# Patient Record
Sex: MALE | Race: WHITE | Employment: OTHER | URBAN - METROPOLITAN AREA
[De-identification: names, ages, dates, MRNs, and addresses within clinical notes are randomized per-mention and may not be internally consistent; named-entity substitution may affect disease eponyms.]

---

## 2020-01-03 PROBLEM — N40.1 BPH WITH OBSTRUCTION/LOWER URINARY TRACT SYMPTOMS: Status: ACTIVE | Noted: 2020-01-03

## 2020-01-03 PROBLEM — N13.8 BPH WITH OBSTRUCTION/LOWER URINARY TRACT SYMPTOMS: Status: ACTIVE | Noted: 2020-01-03

## 2020-01-03 PROBLEM — R39.15 URGENCY OF URINATION: Status: ACTIVE | Noted: 2020-01-03

## 2020-01-03 PROBLEM — Z12.5 PROSTATE CANCER SCREENING: Status: ACTIVE | Noted: 2020-01-03

## 2020-01-06 ENCOUNTER — HOSPITAL ENCOUNTER (OUTPATIENT)
Dept: PHYSICAL THERAPY | Facility: HOSPITAL | Age: 75
Discharge: HOME | End: 2020-01-06
Attending: ORTHOPAEDIC SURGERY
Payer: MEDICARE

## 2020-01-06 ENCOUNTER — LABORATORY ENCOUNTER (OUTPATIENT)
Dept: LAB | Facility: HOSPITAL | Age: 75
End: 2020-01-06
Attending: ORTHOPAEDIC SURGERY
Payer: MEDICARE

## 2020-01-06 DIAGNOSIS — M16.12 OSTEOARTHRITIS OF LEFT HIP: ICD-10-CM

## 2020-01-06 LAB
ANTIBODY SCREEN: NEGATIVE
RH BLOOD TYPE: POSITIVE

## 2020-01-06 PROCEDURE — 87081 CULTURE SCREEN ONLY: CPT

## 2020-01-06 PROCEDURE — 87147 CULTURE TYPE IMMUNOLOGIC: CPT

## 2020-01-06 PROCEDURE — 86850 RBC ANTIBODY SCREEN: CPT

## 2020-01-06 PROCEDURE — 86901 BLOOD TYPING SEROLOGIC RH(D): CPT

## 2020-01-06 PROCEDURE — 86900 BLOOD TYPING SEROLOGIC ABO: CPT

## 2020-01-27 ENCOUNTER — APPOINTMENT (OUTPATIENT)
Dept: GENERAL RADIOLOGY | Facility: HOSPITAL | Age: 75
DRG: 470 | End: 2020-01-27
Attending: ORTHOPAEDIC SURGERY
Payer: MEDICARE

## 2020-01-27 ENCOUNTER — ANESTHESIA EVENT (OUTPATIENT)
Dept: SURGERY | Facility: HOSPITAL | Age: 75
DRG: 470 | End: 2020-01-27
Payer: MEDICARE

## 2020-01-27 ENCOUNTER — ANESTHESIA (OUTPATIENT)
Dept: SURGERY | Facility: HOSPITAL | Age: 75
DRG: 470 | End: 2020-01-27
Payer: MEDICARE

## 2020-01-27 ENCOUNTER — HOSPITAL ENCOUNTER (INPATIENT)
Facility: HOSPITAL | Age: 75
LOS: 1 days | Discharge: HOME HEALTH | DRG: 470 | End: 2020-01-28
Attending: ORTHOPAEDIC SURGERY | Admitting: ORTHOPAEDIC SURGERY
Payer: MEDICARE

## 2020-01-27 DIAGNOSIS — M16.12 OSTEOARTHRITIS OF LEFT HIP: Primary | ICD-10-CM

## 2020-01-27 DIAGNOSIS — M16.12 PRIMARY OSTEOARTHRITIS OF LEFT HIP: ICD-10-CM

## 2020-01-27 PROCEDURE — 88304 TISSUE EXAM BY PATHOLOGIST: CPT | Performed by: ORTHOPAEDIC SURGERY

## 2020-01-27 PROCEDURE — 76000 FLUOROSCOPY <1 HR PHYS/QHP: CPT | Performed by: ORTHOPAEDIC SURGERY

## 2020-01-27 PROCEDURE — 97530 THERAPEUTIC ACTIVITIES: CPT

## 2020-01-27 PROCEDURE — 3E0T3BZ INTRODUCTION OF ANESTHETIC AGENT INTO PERIPHERAL NERVES AND PLEXI, PERCUTANEOUS APPROACH: ICD-10-PCS | Performed by: ANESTHESIOLOGY

## 2020-01-27 PROCEDURE — 97161 PT EVAL LOW COMPLEX 20 MIN: CPT

## 2020-01-27 PROCEDURE — 88311 DECALCIFY TISSUE: CPT | Performed by: ORTHOPAEDIC SURGERY

## 2020-01-27 PROCEDURE — 0SRB03Z REPLACEMENT OF LEFT HIP JOINT WITH CERAMIC SYNTHETIC SUBSTITUTE, OPEN APPROACH: ICD-10-PCS | Performed by: ORTHOPAEDIC SURGERY

## 2020-01-27 DEVICE — PINNACLE HIP SOLUTIONS ALTRX POLYETHYLENE ACETABULAR LINER NEUTRAL 36MM ID 56MM OD
Type: IMPLANTABLE DEVICE | Site: HIP | Status: FUNCTIONAL
Brand: PINNACLE ALTRX

## 2020-01-27 DEVICE — BIOLOX DELTA CERAMIC FEMORAL HEAD +5.0 36MM DIA 12/14 TAPER
Type: IMPLANTABLE DEVICE | Site: HIP | Status: FUNCTIONAL
Brand: BIOLOX DELTA

## 2020-01-27 DEVICE — PINNACLE POROCOAT ACETABULAR SHELL SECTOR II 56MM OD
Type: IMPLANTABLE DEVICE | Site: HIP | Status: FUNCTIONAL
Brand: PINNACLE POROCOAT

## 2020-01-27 RX ORDER — ACETAMINOPHEN 325 MG/1
TABLET ORAL
Status: COMPLETED
Start: 2020-01-27 | End: 2020-01-27

## 2020-01-27 RX ORDER — ATORVASTATIN CALCIUM 20 MG/1
20 TABLET, FILM COATED ORAL NIGHTLY
Status: DISCONTINUED | OUTPATIENT
Start: 2020-01-27 | End: 2020-01-28

## 2020-01-27 RX ORDER — MIDAZOLAM HYDROCHLORIDE 1 MG/ML
1 INJECTION INTRAMUSCULAR; INTRAVENOUS EVERY 5 MIN PRN
Status: DISCONTINUED | OUTPATIENT
Start: 2020-01-27 | End: 2020-01-27 | Stop reason: HOSPADM

## 2020-01-27 RX ORDER — ACETAMINOPHEN 325 MG/1
650 TABLET ORAL ONCE
Status: COMPLETED | OUTPATIENT
Start: 2020-01-27 | End: 2020-01-27

## 2020-01-27 RX ORDER — TAMSULOSIN HYDROCHLORIDE 0.4 MG/1
0.4 CAPSULE ORAL DAILY
Status: DISCONTINUED | OUTPATIENT
Start: 2020-01-27 | End: 2020-01-28

## 2020-01-27 RX ORDER — PROCHLORPERAZINE EDISYLATE 5 MG/ML
10 INJECTION INTRAMUSCULAR; INTRAVENOUS EVERY 6 HOURS PRN
Status: DISCONTINUED | OUTPATIENT
Start: 2020-01-27 | End: 2020-01-28

## 2020-01-27 RX ORDER — HYDROMORPHONE HYDROCHLORIDE 1 MG/ML
INJECTION, SOLUTION INTRAMUSCULAR; INTRAVENOUS; SUBCUTANEOUS
Status: COMPLETED
Start: 2020-01-27 | End: 2020-01-27

## 2020-01-27 RX ORDER — BUPIVACAINE HYDROCHLORIDE 7.5 MG/ML
INJECTION, SOLUTION INTRASPINAL AS NEEDED
Status: DISCONTINUED | OUTPATIENT
Start: 2020-01-27 | End: 2020-01-27 | Stop reason: SURG

## 2020-01-27 RX ORDER — BUPIVACAINE HYDROCHLORIDE AND EPINEPHRINE 2.5; 5 MG/ML; UG/ML
INJECTION, SOLUTION EPIDURAL; INFILTRATION; INTRACAUDAL; PERINEURAL AS NEEDED
Status: DISCONTINUED | OUTPATIENT
Start: 2020-01-27 | End: 2020-01-27 | Stop reason: SURG

## 2020-01-27 RX ORDER — ONDANSETRON 2 MG/ML
INJECTION INTRAMUSCULAR; INTRAVENOUS
Status: COMPLETED
Start: 2020-01-27 | End: 2020-01-27

## 2020-01-27 RX ORDER — SODIUM CHLORIDE, SODIUM LACTATE, POTASSIUM CHLORIDE, CALCIUM CHLORIDE 600; 310; 30; 20 MG/100ML; MG/100ML; MG/100ML; MG/100ML
INJECTION, SOLUTION INTRAVENOUS CONTINUOUS
Status: DISCONTINUED | OUTPATIENT
Start: 2020-01-27 | End: 2020-01-27

## 2020-01-27 RX ORDER — TIZANIDINE 2 MG/1
2 TABLET ORAL 3 TIMES DAILY PRN
Status: DISCONTINUED | OUTPATIENT
Start: 2020-01-27 | End: 2020-01-28

## 2020-01-27 RX ORDER — ACETAMINOPHEN 500 MG
500 TABLET ORAL EVERY 6 HOURS
Qty: 100 TABLET | Refills: 0 | Status: SHIPPED | OUTPATIENT
Start: 2020-01-27 | End: 2021-07-07

## 2020-01-27 RX ORDER — MEPERIDINE HYDROCHLORIDE 25 MG/ML
25 INJECTION INTRAMUSCULAR; INTRAVENOUS; SUBCUTANEOUS
Status: DISCONTINUED | OUTPATIENT
Start: 2020-01-27 | End: 2020-01-27 | Stop reason: HOSPADM

## 2020-01-27 RX ORDER — POLYETHYLENE GLYCOL 3350 17 G/17G
17 POWDER, FOR SOLUTION ORAL DAILY PRN
Status: DISCONTINUED | OUTPATIENT
Start: 2020-01-27 | End: 2020-01-28

## 2020-01-27 RX ORDER — OXYCODONE HYDROCHLORIDE 5 MG/1
5 TABLET ORAL EVERY 4 HOURS PRN
Status: DISCONTINUED | OUTPATIENT
Start: 2020-01-27 | End: 2020-01-28

## 2020-01-27 RX ORDER — TRAMADOL HYDROCHLORIDE 50 MG/1
TABLET ORAL
Qty: 30 TABLET | Refills: 0 | Status: SHIPPED | OUTPATIENT
Start: 2020-01-27 | End: 2020-03-12 | Stop reason: ALTCHOICE

## 2020-01-27 RX ORDER — OXYCODONE HYDROCHLORIDE 10 MG/1
10 TABLET ORAL EVERY 4 HOURS PRN
Status: DISCONTINUED | OUTPATIENT
Start: 2020-01-27 | End: 2020-01-28

## 2020-01-27 RX ORDER — FENOFIBRATE 145 MG/1
145 TABLET, COATED ORAL DAILY
COMMUNITY

## 2020-01-27 RX ORDER — KETOROLAC TROMETHAMINE 15 MG/ML
15 INJECTION, SOLUTION INTRAMUSCULAR; INTRAVENOUS EVERY 6 HOURS
Status: DISPENSED | OUTPATIENT
Start: 2020-01-27 | End: 2020-01-28

## 2020-01-27 RX ORDER — ONDANSETRON 2 MG/ML
4 INJECTION INTRAMUSCULAR; INTRAVENOUS AS NEEDED
Status: DISCONTINUED | OUTPATIENT
Start: 2020-01-27 | End: 2020-01-27 | Stop reason: HOSPADM

## 2020-01-27 RX ORDER — METOCLOPRAMIDE HYDROCHLORIDE 5 MG/ML
10 INJECTION INTRAMUSCULAR; INTRAVENOUS EVERY 6 HOURS PRN
Status: DISCONTINUED | OUTPATIENT
Start: 2020-01-27 | End: 2020-01-28

## 2020-01-27 RX ORDER — SENNOSIDES 8.6 MG
17.2 TABLET ORAL NIGHTLY
Status: DISCONTINUED | OUTPATIENT
Start: 2020-01-27 | End: 2020-01-28

## 2020-01-27 RX ORDER — DIPHENHYDRAMINE HYDROCHLORIDE 50 MG/ML
12.5 INJECTION INTRAMUSCULAR; INTRAVENOUS EVERY 4 HOURS PRN
Status: DISCONTINUED | OUTPATIENT
Start: 2020-01-27 | End: 2020-01-28

## 2020-01-27 RX ORDER — DOCUSATE SODIUM 100 MG/1
100 CAPSULE, LIQUID FILLED ORAL 2 TIMES DAILY
Status: DISCONTINUED | OUTPATIENT
Start: 2020-01-27 | End: 2020-01-28

## 2020-01-27 RX ORDER — ACETAMINOPHEN 325 MG/1
650 TABLET ORAL 4 TIMES DAILY
Status: DISCONTINUED | OUTPATIENT
Start: 2020-01-27 | End: 2020-01-28

## 2020-01-27 RX ORDER — DOCUSATE SODIUM 100 MG/1
100 CAPSULE, LIQUID FILLED ORAL 2 TIMES DAILY
Qty: 60 CAPSULE | Refills: 0 | Status: SHIPPED | OUTPATIENT
Start: 2020-01-27 | End: 2021-07-07

## 2020-01-27 RX ORDER — CEFAZOLIN SODIUM/WATER 2 G/20 ML
2 SYRINGE (ML) INTRAVENOUS EVERY 8 HOURS
Status: COMPLETED | OUTPATIENT
Start: 2020-01-27 | End: 2020-01-28

## 2020-01-27 RX ORDER — NALOXONE HYDROCHLORIDE 0.4 MG/ML
80 INJECTION, SOLUTION INTRAMUSCULAR; INTRAVENOUS; SUBCUTANEOUS AS NEEDED
Status: DISCONTINUED | OUTPATIENT
Start: 2020-01-27 | End: 2020-01-27 | Stop reason: HOSPADM

## 2020-01-27 RX ORDER — HYDROMORPHONE HYDROCHLORIDE 1 MG/ML
0.2 INJECTION, SOLUTION INTRAMUSCULAR; INTRAVENOUS; SUBCUTANEOUS EVERY 2 HOUR PRN
Status: DISCONTINUED | OUTPATIENT
Start: 2020-01-27 | End: 2020-01-28

## 2020-01-27 RX ORDER — DIPHENHYDRAMINE HCL 25 MG
25 CAPSULE ORAL EVERY 4 HOURS PRN
Status: DISCONTINUED | OUTPATIENT
Start: 2020-01-27 | End: 2020-01-28

## 2020-01-27 RX ORDER — ESCITALOPRAM OXALATE 10 MG/1
10 TABLET ORAL DAILY
Status: DISCONTINUED | OUTPATIENT
Start: 2020-01-28 | End: 2020-01-28

## 2020-01-27 RX ORDER — HYDROMORPHONE HYDROCHLORIDE 1 MG/ML
0.8 INJECTION, SOLUTION INTRAMUSCULAR; INTRAVENOUS; SUBCUTANEOUS EVERY 2 HOUR PRN
Status: DISCONTINUED | OUTPATIENT
Start: 2020-01-27 | End: 2020-01-28

## 2020-01-27 RX ORDER — HYDROCODONE BITARTRATE AND ACETAMINOPHEN 10; 325 MG/1; MG/1
TABLET ORAL
Qty: 30 TABLET | Refills: 0 | Status: SHIPPED | OUTPATIENT
Start: 2020-01-27 | End: 2020-03-12 | Stop reason: ALTCHOICE

## 2020-01-27 RX ORDER — ONDANSETRON 2 MG/ML
4 INJECTION INTRAMUSCULAR; INTRAVENOUS EVERY 4 HOURS PRN
Status: DISCONTINUED | OUTPATIENT
Start: 2020-01-27 | End: 2020-01-28

## 2020-01-27 RX ORDER — DIPHENHYDRAMINE HYDROCHLORIDE 50 MG/ML
25 INJECTION INTRAMUSCULAR; INTRAVENOUS ONCE AS NEEDED
Status: ACTIVE | OUTPATIENT
Start: 2020-01-27 | End: 2020-01-27

## 2020-01-27 RX ORDER — DEXAMETHASONE SODIUM PHOSPHATE 4 MG/ML
4 VIAL (ML) INJECTION AS NEEDED
Status: DISCONTINUED | OUTPATIENT
Start: 2020-01-27 | End: 2020-01-27 | Stop reason: HOSPADM

## 2020-01-27 RX ORDER — SODIUM CHLORIDE, SODIUM LACTATE, POTASSIUM CHLORIDE, CALCIUM CHLORIDE 600; 310; 30; 20 MG/100ML; MG/100ML; MG/100ML; MG/100ML
INJECTION, SOLUTION INTRAVENOUS CONTINUOUS
Status: DISCONTINUED | OUTPATIENT
Start: 2020-01-27 | End: 2020-01-27 | Stop reason: HOSPADM

## 2020-01-27 RX ORDER — METOCLOPRAMIDE HYDROCHLORIDE 5 MG/ML
10 INJECTION INTRAMUSCULAR; INTRAVENOUS AS NEEDED
Status: DISCONTINUED | OUTPATIENT
Start: 2020-01-27 | End: 2020-01-27 | Stop reason: HOSPADM

## 2020-01-27 RX ORDER — MIDAZOLAM HYDROCHLORIDE 1 MG/ML
INJECTION INTRAMUSCULAR; INTRAVENOUS AS NEEDED
Status: DISCONTINUED | OUTPATIENT
Start: 2020-01-27 | End: 2020-01-27 | Stop reason: SURG

## 2020-01-27 RX ORDER — SODIUM CHLORIDE 9 MG/ML
INJECTION, SOLUTION INTRAVENOUS CONTINUOUS
Status: DISCONTINUED | OUTPATIENT
Start: 2020-01-27 | End: 2020-01-28

## 2020-01-27 RX ORDER — CEFAZOLIN SODIUM/WATER 2 G/20 ML
2 SYRINGE (ML) INTRAVENOUS ONCE
Status: COMPLETED | OUTPATIENT
Start: 2020-01-27 | End: 2020-01-27

## 2020-01-27 RX ORDER — LISINOPRIL AND HYDROCHLOROTHIAZIDE 12.5; 1 MG/1; MG/1
1 TABLET ORAL
Status: ON HOLD | COMMUNITY
Start: 2019-12-27 | End: 2020-01-28

## 2020-01-27 RX ORDER — HYDROMORPHONE HYDROCHLORIDE 1 MG/ML
0.4 INJECTION, SOLUTION INTRAMUSCULAR; INTRAVENOUS; SUBCUTANEOUS EVERY 2 HOUR PRN
Status: DISCONTINUED | OUTPATIENT
Start: 2020-01-27 | End: 2020-01-28

## 2020-01-27 RX ORDER — FENOFIBRATE 134 MG/1
134 CAPSULE ORAL DAILY
Status: DISCONTINUED | OUTPATIENT
Start: 2020-01-27 | End: 2020-01-28

## 2020-01-27 RX ORDER — BISACODYL 10 MG
10 SUPPOSITORY, RECTAL RECTAL
Status: DISCONTINUED | OUTPATIENT
Start: 2020-01-27 | End: 2020-01-28

## 2020-01-27 RX ORDER — SODIUM PHOSPHATE, DIBASIC AND SODIUM PHOSPHATE, MONOBASIC 7; 19 G/133ML; G/133ML
1 ENEMA RECTAL ONCE AS NEEDED
Status: DISCONTINUED | OUTPATIENT
Start: 2020-01-27 | End: 2020-01-28

## 2020-01-27 RX ORDER — ACETAMINOPHEN 500 MG
1000 TABLET ORAL ONCE
Status: DISCONTINUED | OUTPATIENT
Start: 2020-01-27 | End: 2020-01-27

## 2020-01-27 RX ORDER — HYDROMORPHONE HYDROCHLORIDE 1 MG/ML
0.5 INJECTION, SOLUTION INTRAMUSCULAR; INTRAVENOUS; SUBCUTANEOUS EVERY 5 MIN PRN
Status: DISCONTINUED | OUTPATIENT
Start: 2020-01-27 | End: 2020-01-27 | Stop reason: HOSPADM

## 2020-01-27 RX ORDER — LISINOPRIL AND HYDROCHLOROTHIAZIDE 12.5; 1 MG/1; MG/1
1 TABLET ORAL
Status: DISCONTINUED | OUTPATIENT
Start: 2020-01-28 | End: 2020-01-27 | Stop reason: SDUPTHER

## 2020-01-27 RX ORDER — ASPIRIN 81 MG/1
81 TABLET ORAL 2 TIMES DAILY
Qty: 60 TABLET | Refills: 0 | Status: SHIPPED | OUTPATIENT
Start: 2020-01-27 | End: 2021-07-07

## 2020-01-27 RX ORDER — OXYCODONE HYDROCHLORIDE 15 MG/1
15 TABLET ORAL EVERY 4 HOURS PRN
Status: DISCONTINUED | OUTPATIENT
Start: 2020-01-27 | End: 2020-01-28

## 2020-01-27 RX ADMIN — CEFAZOLIN SODIUM/WATER 2 G: 2 G/20 ML SYRINGE (ML) INTRAVENOUS at 09:23:00

## 2020-01-27 RX ADMIN — MIDAZOLAM HYDROCHLORIDE 2 MG: 1 INJECTION INTRAMUSCULAR; INTRAVENOUS at 09:23:00

## 2020-01-27 RX ADMIN — SODIUM CHLORIDE, SODIUM LACTATE, POTASSIUM CHLORIDE, CALCIUM CHLORIDE: 600; 310; 30; 20 INJECTION, SOLUTION INTRAVENOUS at 10:10:00

## 2020-01-27 RX ADMIN — SODIUM CHLORIDE, SODIUM LACTATE, POTASSIUM CHLORIDE, CALCIUM CHLORIDE: 600; 310; 30; 20 INJECTION, SOLUTION INTRAVENOUS at 11:24:00

## 2020-01-27 RX ADMIN — BUPIVACAINE HYDROCHLORIDE AND EPINEPHRINE 30 ML: 2.5; 5 INJECTION, SOLUTION EPIDURAL; INFILTRATION; INTRACAUDAL; PERINEURAL at 09:34:00

## 2020-01-27 RX ADMIN — BUPIVACAINE HYDROCHLORIDE 1.2 ML: 7.5 INJECTION, SOLUTION INTRASPINAL at 09:29:00

## 2020-01-27 NOTE — ANESTHESIA PROCEDURE NOTES
[facility]  Performed by: Ian Uriarte MD  Authorized by: Ian Uriarte MD       General Information and Staff    Start Time:  1/27/2020 9:31 AM  End Time:  1/27/2020 9:35 AM  Anesthesiologist:  Wanda Dietz MD  Performed by:   Anesthesiologist

## 2020-01-27 NOTE — CM/SW NOTE
PT recommending HH/PT. [facility] care with referral.  Liaison will meet with the patient/familyto provide choice, explanation of services, and financial disclosure. Orders entered.     HOME SITUATION  Type of Home: [address] [place] [address]

## 2020-01-27 NOTE — ANESTHESIA PREPROCEDURE EVALUATION
PRE-OP EVALUATION    Patient Name: Karen Martin    Pre-op Diagnosis: Primary osteoarthritis of left hip [M16.12]    Procedure(s):  LEFT ANTERIOR TOTAL HIP REPLACEMENT    Surgeon(s) and Role:     Emperatriz Haider MD - Primary    Pre-op vitals reviewed.   Duane Keith Neuro/Psych    Negative neuro/psych ROS.                                 Past Surgical History:   Procedure Laterality Date   • CHOLECYSTECTOMY     • COLECTOMY     • COLONOSCOPY     • OTHER      bariatric lap banding      Social History    Tobacco Use

## 2020-01-27 NOTE — PLAN OF CARE
Received pt at 1330. ROSANGELA SHEEHAN with therapy. Dressing dry and intact to left hip. Pt and family verbalized understanding of POC and fall precautions. Plans home with [facility] on dc. Will continue to monitor.

## 2020-01-27 NOTE — HOME CARE LIAISON
[facility] REC'D HH ORDER FOR SERVICES ON DC. PTNT LIVES IN [place] [address]. [facility] WILL CONTACT THE INTAKE DEPARTMENT TO SEE IF WE ARE ABLE TO PROVIDE SERVICES FOR THIS PTNT. AWAITING AN ANSWER.    [address] [place] [address] Dr FOLEY      THANKS  Endy Bowers

## 2020-01-27 NOTE — PROGRESS NOTES
Iviseliu Forrestelle Shannon   12/5/2019 9:20 AM   Office Visit   MRN:  [id number]   Description: 75year old male Provider: Titi Valenzuela MD Department: [facility]   Scanning Cover Sheet     Click to print Karena Circe [address] for [address] Alcohol use: Not on file    Drug use: Not on file         REVIEW OF SYSTEMS:      GENERAL HEALTH: feels well otherwise  SKIN: denies any unusual skin lesions or rashes  RESPIRATORY: denies shortness of breath with exertion  CARDIOVASCULAR: denies chest p Dragon speech recognition software was used. If dictation is unclear, please contact me with any questions or clarifications.

## 2020-01-27 NOTE — ANESTHESIA PROCEDURE NOTES
[facility]  Performed by: Aria Uriarte MD  Authorized by: Aria Uriarte MD       General Information and Staff    Start Time:  1/27/2020 9:24 AM  End Time:  1/27/2020 9:29 AM  Anesthesiologist:  Whitney Hoffman MD  Performed by:   Anesthesiologist  P

## 2020-01-27 NOTE — CONSULTS
[facility] hospitalist initial consult note  PCP Danna Carmona at the request of Dr. Nadege Sanches  Reason for consult medical co-management  HPI 66 yo male with multiple medical problems including but not limited to  HTN, depression, HL, OA here s/p DIRECT AN Attends meetings of clubs or organizations: Not on file        Relationship status: Not on file      Intimate partner violence:        Fear of current or ex partner: Not on file        Emotionally abused: Not on file        Physically abused: Not on file

## 2020-01-27 NOTE — INTERVAL H&P NOTE
Pre-op Diagnosis: Primary osteoarthritis of left hip [M16.12]    The above referenced H&P was reviewed by Demetri Mccallum MD on 1/27/2020, the patient was examined and no significant changes have occurred in the patient's condition since the H&P was performed.

## 2020-01-27 NOTE — PHYSICAL THERAPY NOTE
PHYSICAL THERAPY HIP EVALUATION - INPATIENT     Room Number: 365/365-A  Evaluation Date: 1/27/2020  Type of Evaluation: Initial  Physician Order: PT Eval and Treat    Presenting Problem: S/p Direct Anterior Left RUT on 01/27/2020  Reason for Therapy: [facility] 8(Decreased to 6/10 after mobility)  Location: Left Hip @ surgical site & left thigh  Management Techniques: Activity promotion; Body mechanics;Breathing techniques;Relaxation;Repositioning    COGNITION  · Overall Cognitive Status:  WFL - within functional definations    Skilled Therapy Provided: Evaluation completed. Patient was instructed & educated in post surgical precautions & weight bearing status. Reviewed  handouts for precautions & reviewed multiple times during this session.  Patient was able to parti skills & safety. The patient is below baseline and would benefit from skilled inpatient PT to address the above deficits to assist patient in returning to prior to level of function.   DISCHARGE RECOMMENDATIONS  PT Discharge Recommendations: Home with home

## 2020-01-27 NOTE — ANESTHESIA POSTPROCEDURE EVALUATION
[address] [facility] Patient Status:  Surgery Admit - Inpt   Age/Gender 67year old male MRN [id number]   [facility] SURGERY Attending Titi Valenzuela MD   Hosp Day # 0 PCP [facility]       Anesthesia Post-op Note    Procedure

## 2020-01-27 NOTE — OPERATIVE REPORT
[address] REPLACEMENT OPERATIVE REPORT    DATE OF SURGERY 1/27/2020    Mark Olvera       [id number]     1/1/1945    PRE-OP DX:  LEFT HIP PRIMARY OSTEOARTHRITIS  POST-OP DX:  LEFT HIP PRIMARY OSTEOARTHRITIS  PROCEDURE:  DIRECT ANTERIO DEGENERATIVE CHANGES WERE NOTED. FEMORAL NECK OSTEOTOMY WAS MADE. FEMORAL HEAD WAS REMOVED WITH A CORK SCREW. POSTERIOR AND INFERIOR ACETABULAR RETRACTORS WERE PLACED CAREFULLY. GOOD ACETABULAR EXPOSURE WAS OBTAINED. LABRAL TISSUE WAS EXCISED.   MED STRESS. ALL THE TRIAL IMPLANTS WERE REMOVED. WOUND WAS IRRIGATED COPIOUSLY. HEMOSTASIS WAS OBTAINED. ACETABULUM WAS REEXPOSED. REAL LINER WAS IMPACTED. ITS SEATING WAS VERIFIED. PROXIMAL FEMUR WAS EXPOSED.   REAL FEMORAL STEM WAS INSERTED WITH GOOD S

## 2020-01-28 VITALS
DIASTOLIC BLOOD PRESSURE: 62 MMHG | WEIGHT: 261.13 LBS | BODY MASS INDEX: 35.37 KG/M2 | TEMPERATURE: 98 F | HEART RATE: 58 BPM | SYSTOLIC BLOOD PRESSURE: 116 MMHG | HEIGHT: 72 IN | OXYGEN SATURATION: 95 % | RESPIRATION RATE: 14 BRPM

## 2020-01-28 LAB
ANION GAP SERPL CALC-SCNC: 3 MMOL/L (ref 0–18)
BILIRUB UR QL STRIP.AUTO: NEGATIVE
BUN BLD-MCNC: 32 MG/DL (ref 7–18)
BUN/CREAT SERPL: 19.5 (ref 10–20)
CALCIUM BLD-MCNC: 8.8 MG/DL (ref 8.5–10.1)
CHLORIDE SERPL-SCNC: 110 MMOL/L (ref 98–112)
CLARITY UR REFRACT.AUTO: CLEAR
CO2 SERPL-SCNC: 27 MMOL/L (ref 21–32)
COLOR UR AUTO: YELLOW
CREAT BLD-MCNC: 1.64 MG/DL (ref 0.7–1.3)
DEPRECATED RDW RBC AUTO: 46.3 FL (ref 35.1–46.3)
ERYTHROCYTE [DISTWIDTH] IN BLOOD BY AUTOMATED COUNT: 13.7 % (ref 11–15)
GLUCOSE BLD-MCNC: 98 MG/DL (ref 70–99)
GLUCOSE UR STRIP.AUTO-MCNC: NEGATIVE MG/DL
HCT VFR BLD AUTO: 36.6 % (ref 39–53)
HGB BLD-MCNC: 11.9 G/DL (ref 13–17.5)
KETONES UR STRIP.AUTO-MCNC: NEGATIVE MG/DL
LEUKOCYTE ESTERASE UR QL STRIP.AUTO: NEGATIVE
MCH RBC QN AUTO: 29.7 PG (ref 26–34)
MCHC RBC AUTO-ENTMCNC: 32.5 G/DL (ref 31–37)
MCV RBC AUTO: 91.3 FL (ref 80–100)
NITRITE UR QL STRIP.AUTO: NEGATIVE
OSMOLALITY SERPL CALC.SUM OF ELEC: 297 MOSM/KG (ref 275–295)
PH UR STRIP.AUTO: 5 [PH] (ref 4.5–8)
PLATELET # BLD AUTO: 129 10(3)UL (ref 150–450)
POTASSIUM SERPL-SCNC: 4.3 MMOL/L (ref 3.5–5.1)
PROT UR STRIP.AUTO-MCNC: NEGATIVE MG/DL
RBC # BLD AUTO: 4.01 X10(6)UL (ref 3.8–5.8)
RBC UR QL AUTO: NEGATIVE
SODIUM SERPL-SCNC: 140 MMOL/L (ref 136–145)
SP GR UR STRIP.AUTO: 1.02 (ref 1–1.03)
UROBILINOGEN UR STRIP.AUTO-MCNC: <2 MG/DL
WBC # BLD AUTO: 5.3 X10(3) UL (ref 4–11)

## 2020-01-28 PROCEDURE — 85027 COMPLETE CBC AUTOMATED: CPT | Performed by: PHYSICIAN ASSISTANT

## 2020-01-28 PROCEDURE — 81003 URINALYSIS AUTO W/O SCOPE: CPT | Performed by: PHYSICIAN ASSISTANT

## 2020-01-28 PROCEDURE — 97165 OT EVAL LOW COMPLEX 30 MIN: CPT

## 2020-01-28 PROCEDURE — 97535 SELF CARE MNGMENT TRAINING: CPT

## 2020-01-28 PROCEDURE — 80048 BASIC METABOLIC PNL TOTAL CA: CPT | Performed by: PHYSICIAN ASSISTANT

## 2020-01-28 PROCEDURE — 97116 GAIT TRAINING THERAPY: CPT

## 2020-01-28 PROCEDURE — 97150 GROUP THERAPEUTIC PROCEDURES: CPT

## 2020-01-28 PROCEDURE — 51701 INSERT BLADDER CATHETER: CPT

## 2020-01-28 RX ORDER — HYDROCODONE BITARTRATE AND ACETAMINOPHEN 5; 325 MG/1; MG/1
1-2 TABLET ORAL EVERY 4 HOURS PRN
Status: DISCONTINUED | OUTPATIENT
Start: 2020-01-28 | End: 2020-01-28

## 2020-01-28 RX ORDER — DOCUSATE SODIUM 100 MG/1
100 CAPSULE, LIQUID FILLED ORAL 2 TIMES DAILY
Qty: 20 CAPSULE | Refills: 0 | Status: SHIPPED | OUTPATIENT
Start: 2020-01-28 | End: 2021-07-07

## 2020-01-28 NOTE — CERTIFICATION
**Certification    PHYSICIAN Certification of Need for Inpatient Hospitalization    Based on the his current state of illness, Vladimir Tejada requires inpatient hospitalization for his orthopedic surgery      This requires inpatient medical treatment [place]

## 2020-01-28 NOTE — HOME CARE LIAISON
SPOKE WITH RN AT [facility] 287 ARE ABLE TO PROVIDE SN/PT ON Brent Perez

## 2020-01-28 NOTE — PHYSICAL THERAPY NOTE
PHYSICAL THERAPY HIP TREATMENT NOTE - INPATIENT      Room Number: 365/365-A     Session: 1 and 2  Number of Visits to Meet Established Goals: 2    Presenting Problem: S/p Direct Anterior Left RUT on 01/27/2020    Problem List  Active Problems:    Comfort Crum with a railing?: A Little       AM-PAC Score:  Raw Score: 18   Approx Degree of Impairment: 46.58%   Standardized Score (AM-PAC Scale): 43.63   CMS Modifier (G-Code): CK    FUNCTIONAL ABILITY STATUS  Gait Assessment   Gait Assistance: Supervision  Distance concerns addressed    ASSESSMENT      Pt seen BID in PT group for gait training, stair training, car transfer and BLE strengthening: S/P L RUT (anterior).     Results of the AM-PAC \"6 clicks\" Inpatient Daily Mobility Short Form for the patient is 46.58% d

## 2020-01-28 NOTE — OCCUPATIONAL THERAPY NOTE
OCCUPATIONAL THERAPY QUICK EVALUATION - INPATIENT    Room Number: 365/365-A  Evaluation Date: 1/28/2020     Type of Evaluation: Quick Eval  Presenting Problem: (L RUT)    Physician Order: IP Consult to Occupational Therapy  Reason for Therapy:  ADL/IADL Dy ‘6-Clicks’ Inpatient Daily Activity Short Form   How much help from another person does the patient currently need…  -   Putting on and taking off regular lower body clothing?: A Little   -   Bathing (including washing, rinsing, drying)?: A Little  -   Jaciel hip stiffness, hip precautions, decreased balance, decreased knowledge of adaptive equipment.  These deficits impact the patient’s ability to participate in ADLs, instrumental activities of daily living, rest and sleep, work, leisure and social participatio

## 2020-01-28 NOTE — PLAN OF CARE
Pod #1, A/oX4, RA, , IS encouraged, [place] with Bilateral HA, on eliquis, LBM 01/27/20, IVF infusing as ordered, bladder scan at 0000 showed 561 cc, straight cath at 0033 of 650 cc with yellow, clear output, next due to void at 0630, up with assist, Chan

## 2020-01-28 NOTE — PROGRESS NOTES
Orthopedic surgery progress note    [place] Patient Status:  Inpatient    1945 MRN [id number]   [facility] 3SW-A Attending Valdemar Bush MD   [facility] Day # 1 PCP [facility]       Subjective:  No major hip complaints, but can't

## 2020-01-28 NOTE — CM/SW NOTE
Per [facility] with [facility], agency not able to accept pt. Re-referred to [facility] and I spoke with Marcos Dai from [employer] and they are able to accept pt. Will send AVS once completed.      Ayaka Aranda RN, [address] [place] [address]  Extension [phone]

## 2020-01-28 NOTE — CONSULTS
[facility] Urology   Consultation Note    Tra Homar Ortega Patient Status:  Inpatient    1945 MRN [id number]   [facility] 3SW-A Attending Winter Mejía MD   Hosp Day # 1 PCP [facility]     Reason for Consultation:  Urinary PRN **OR** OxyCODONE HCl IR (ROXICODONE) immediate release tab 15 mg, 15 mg, Oral, Q4H PRN  •  HYDROmorphone HCl (DILAUDID) 1 MG/ML injection 0.2 mg, 0.2 mg, Intravenous, Q2H PRN **OR** HYDROmorphone HCl (DILAUDID) 1 MG/ML injection 0.4 mg, 0.4 mg, Audra Taylor noted in HPI.     Physical Exam:  BP 90/50 (BP Location: Left arm)   Pulse 51   Temp 97.9 °F (36.6 °C) (Oral)   Resp 14   Ht 6' (1.829 m)   Wt 261 lb 2.2 oz (118.5 kg)   SpO2 91%   BMI 35.42 kg/m²   GENERAL: The patient is resting in bed in no acute distres AM

## 2020-01-28 NOTE — PLAN OF CARE
Plan of care discussed with Dr. Rony Eubanks, order to start Norco 1-2 5mg tablets PRN Q4H, consult for urology received, paged on-call urology, awaiting call back

## 2020-01-28 NOTE — PLAN OF CARE
Dr. Flynn Cooley urology on call paged back, discussed plan of care, Gallegos ordered to be put in, Urology will come by to see Pt in afternoon

## 2020-01-28 NOTE — PROGRESS NOTES
[facility] hospitalist daily note  Patient was seen/examined on 1/28/20    S; no chest pain,  No SOB, no abd pain, no nausea/emesis  Gallegos placed during admit  Pt hoping to be discharged today  Pt had low BP, IVF given with resolution.  Pt denies dizziness or ne

## 2020-01-29 NOTE — PLAN OF CARE
Pt cleared for discharge home today with solis catheter and home health care. Pt and wife attended discharge education class this afternoon. Solis catheter placed this morning, solis and leg bag teaching completed with pt and wife.  Demonstration of emptyin

## 2020-01-29 NOTE — CM/SW NOTE
01/28/20 1300   Discharge disposition   Expected discharge disposition Home-Health   Name of [address] services after discharge Skilled home care   Discharge transportation Private car   DC 1/28/2020, AVS sent to Ad

## 2020-02-17 PROBLEM — M25.552 PAIN OF LEFT HIP JOINT: Status: ACTIVE | Noted: 2020-02-17

## 2020-02-17 PROBLEM — Z96.642 STATUS POST TOTAL REPLACEMENT OF LEFT HIP: Status: ACTIVE | Noted: 2020-02-17

## 2020-09-08 ENCOUNTER — APPOINTMENT (OUTPATIENT)
Dept: OTOLARYNGOLOGY | Age: 75
End: 2020-09-08

## 2020-10-07 ENCOUNTER — APPOINTMENT (OUTPATIENT)
Dept: CARDIOLOGY | Age: 75
End: 2020-10-07

## 2021-07-07 ENCOUNTER — OFFICE VISIT (OUTPATIENT)
Dept: INTERNAL MEDICINE CLINIC | Facility: CLINIC | Age: 76
End: 2021-07-07
Payer: MEDICARE

## 2021-07-07 VITALS
WEIGHT: 251.81 LBS | BODY MASS INDEX: 34.11 KG/M2 | OXYGEN SATURATION: 96 % | HEART RATE: 62 BPM | SYSTOLIC BLOOD PRESSURE: 126 MMHG | DIASTOLIC BLOOD PRESSURE: 64 MMHG | TEMPERATURE: 99 F | HEIGHT: 72 IN

## 2021-07-07 DIAGNOSIS — F32.A DEPRESSION, UNSPECIFIED DEPRESSION TYPE: ICD-10-CM

## 2021-07-07 DIAGNOSIS — Z13.1 SCREENING FOR DIABETES MELLITUS: ICD-10-CM

## 2021-07-07 DIAGNOSIS — Z13.0 SCREENING FOR DEFICIENCY ANEMIA: ICD-10-CM

## 2021-07-07 DIAGNOSIS — E78.5 HYPERLIPIDEMIA, UNSPECIFIED HYPERLIPIDEMIA TYPE: Primary | ICD-10-CM

## 2021-07-07 DIAGNOSIS — Z85.038 HISTORY OF COLON CANCER: ICD-10-CM

## 2021-07-07 DIAGNOSIS — Z86.69 HISTORY OF MIGRAINE: ICD-10-CM

## 2021-07-07 DIAGNOSIS — Z90.49 S/P RIGHT COLECTOMY: ICD-10-CM

## 2021-07-07 PROBLEM — R39.15 URGENCY OF URINATION: Status: RESOLVED | Noted: 2020-01-03 | Resolved: 2021-07-07

## 2021-07-07 PROBLEM — Z12.5 PROSTATE CANCER SCREENING: Status: RESOLVED | Noted: 2020-01-03 | Resolved: 2021-07-07

## 2021-07-07 PROCEDURE — 3074F SYST BP LT 130 MM HG: CPT | Performed by: FAMILY MEDICINE

## 2021-07-07 PROCEDURE — 3008F BODY MASS INDEX DOCD: CPT | Performed by: FAMILY MEDICINE

## 2021-07-07 PROCEDURE — 90471 IMMUNIZATION ADMIN: CPT | Performed by: FAMILY MEDICINE

## 2021-07-07 PROCEDURE — 99204 OFFICE O/P NEW MOD 45 MIN: CPT | Performed by: FAMILY MEDICINE

## 2021-07-07 PROCEDURE — 3078F DIAST BP <80 MM HG: CPT | Performed by: FAMILY MEDICINE

## 2021-07-07 RX ORDER — RIZATRIPTAN BENZOATE 10 MG/1
10 TABLET ORAL 2 TIMES DAILY PRN
COMMUNITY
Start: 2021-05-24

## 2021-07-07 RX ORDER — ATORVASTATIN CALCIUM 20 MG/1
20 TABLET, FILM COATED ORAL DAILY
Qty: 90 TABLET | Refills: 3 | Status: SHIPPED | OUTPATIENT
Start: 2021-07-07

## 2021-07-07 NOTE — PATIENT INSTRUCTIONS
Hold Fenofibrate for the next three months and we will plan to repeat your cholesterol again then. Continue the Atorvastatin.   Schedule appointment with the Gastroenterologist.

## 2021-07-08 NOTE — PROGRESS NOTES
HPI/Subjective:   Patient ID: Tegan Souza is a 67year old male. HPI Here to establish care. 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 Has been taking statin and fenofibrate for years for cholesterol. Is due for check. Running Out of Food in the Last Year:       Ran Out of Food in the Last Year:   Transportation Needs:       Lack of Transportation (Medical):       Lack of Transportation (Non-Medical):   Physical Activity:       Days of Exercise per Week:       Minutes of Cardiovascular:      Rate and Rhythm: Normal rate and regular rhythm. Heart sounds: Normal heart sounds. Pulmonary:      Effort: Pulmonary effort is normal.      Breath sounds: Normal breath sounds. Neurological:      Mental Status: He is alert.

## 2022-03-30 ENCOUNTER — TELEPHONE (OUTPATIENT)
Dept: INTERNAL MEDICINE CLINIC | Facility: CLINIC | Age: 77
End: 2022-03-30

## 2022-11-24 ENCOUNTER — HOSPITAL ENCOUNTER (OUTPATIENT)
Age: 77
Discharge: HOME | End: 2022-11-24
Payer: MEDICARE

## 2022-11-24 VITALS
TEMPERATURE: 98 F | OXYGEN SATURATION: 98 % | HEART RATE: 62 BPM | DIASTOLIC BLOOD PRESSURE: 68 MMHG | RESPIRATION RATE: 16 BRPM | SYSTOLIC BLOOD PRESSURE: 98 MMHG

## 2022-11-24 DIAGNOSIS — J06.9 VIRAL UPPER RESPIRATORY ILLNESS: Primary | ICD-10-CM

## 2022-11-24 PROCEDURE — 99203 OFFICE O/P NEW LOW 30 MIN: CPT | Performed by: PHYSICIAN ASSISTANT

## 2022-11-24 RX ORDER — METHYLPREDNISOLONE 4 MG/1
TABLET ORAL
Qty: 1 EACH | Refills: 0 | Status: SHIPPED | OUTPATIENT
Start: 2022-11-24

## 2022-11-24 RX ORDER — BENZONATATE 100 MG/1
100 CAPSULE ORAL 3 TIMES DAILY PRN
Qty: 30 CAPSULE | Refills: 0 | Status: SHIPPED | OUTPATIENT
Start: 2022-11-24 | End: 2022-12-24

## 2022-11-24 NOTE — DISCHARGE INSTRUCTIONS
Monitor closely for fever development, shortness of breath, night sweats or increased production. Primary care follow-up. Push clear fluids.

## 2023-03-09 ENCOUNTER — HOSPITAL ENCOUNTER (OUTPATIENT)
Age: 78
Discharge: HOME | End: 2023-03-09
Payer: MEDICARE

## 2023-03-09 VITALS
BODY MASS INDEX: 29.26 KG/M2 | HEART RATE: 65 BPM | WEIGHT: 216 LBS | SYSTOLIC BLOOD PRESSURE: 125 MMHG | OXYGEN SATURATION: 98 % | RESPIRATION RATE: 18 BRPM | DIASTOLIC BLOOD PRESSURE: 84 MMHG | TEMPERATURE: 97 F | HEIGHT: 72 IN

## 2023-03-09 DIAGNOSIS — Z20.822 LAB TEST NEGATIVE FOR COVID-19 VIRUS: ICD-10-CM

## 2023-03-09 DIAGNOSIS — Z20.822 CLOSE EXPOSURE TO COVID-19 VIRUS: Primary | ICD-10-CM

## 2023-03-09 LAB — SARS-COV-2 RNA RESP QL NAA+PROBE: NOT DETECTED

## 2023-03-09 PROCEDURE — U0002 COVID-19 LAB TEST NON-CDC: HCPCS | Performed by: NURSE PRACTITIONER

## 2023-03-09 PROCEDURE — 99202 OFFICE O/P NEW SF 15 MIN: CPT | Performed by: NURSE PRACTITIONER

## 2023-03-09 RX ORDER — VALSARTAN 40 MG/1
40 TABLET ORAL DAILY
COMMUNITY

## 2023-03-11 ENCOUNTER — HOSPITAL ENCOUNTER (OUTPATIENT)
Age: 78
Discharge: HOME | End: 2023-03-11
Payer: MEDICARE

## 2023-03-11 VITALS
TEMPERATURE: 98 F | OXYGEN SATURATION: 98 % | RESPIRATION RATE: 18 BRPM | DIASTOLIC BLOOD PRESSURE: 66 MMHG | SYSTOLIC BLOOD PRESSURE: 93 MMHG | HEART RATE: 68 BPM

## 2023-03-11 DIAGNOSIS — U07.1 COVID-19: ICD-10-CM

## 2023-03-11 DIAGNOSIS — R05.9 COUGH: Primary | ICD-10-CM

## 2023-03-11 LAB
S PYO AG THROAT QL: NEGATIVE
SARS-COV-2 RNA RESP QL NAA+PROBE: DETECTED

## 2023-03-11 RX ORDER — NIRMATRELVIR AND RITONAVIR 300-100 MG
KIT ORAL
Qty: 30 TABLET | Refills: 0 | Status: SHIPPED | OUTPATIENT
Start: 2023-03-11 | End: 2023-03-16

## 2023-03-11 NOTE — DISCHARGE INSTRUCTIONS
Per the CDC recommendations persons with COVID-19 who have symptoms and were directed to the care for themselves at home may discontinue isolation of the following conditions. Stay home for 5 days. If you have no symptoms or your symptoms are resolving after 5 days you can leave your house. Continue to wear a mask around others for 5 additional days. If you have a fever continue stay at home until your fever resolves. If you are diagnosed with Covid, refrain from exercise until approved by your primary care physician. Please call your primary care provider within 2 to 3 days of your discharge to arrange a telehealth follow-up. CDC does not recommend repeat testing after positive test.  Take Tylenol and ibuprofen as needed for fever, body aches and chills.   Over-the-counter multivitamins, take melatonin at night    Do not take your cholesterol medicine while on the Paxlovid

## 2023-12-30 ENCOUNTER — HOSPITAL ENCOUNTER (OUTPATIENT)
Age: 78
Discharge: HOME | End: 2023-12-30
Payer: MEDICARE

## 2023-12-30 VITALS
WEIGHT: 220 LBS | HEART RATE: 67 BPM | RESPIRATION RATE: 18 BRPM | BODY MASS INDEX: 29.8 KG/M2 | HEIGHT: 72 IN | TEMPERATURE: 99 F | OXYGEN SATURATION: 98 % | DIASTOLIC BLOOD PRESSURE: 89 MMHG | SYSTOLIC BLOOD PRESSURE: 162 MMHG

## 2023-12-30 DIAGNOSIS — H93.8X3 EAR FULLNESS, BILATERAL: ICD-10-CM

## 2023-12-30 DIAGNOSIS — U07.1 LAB TEST POSITIVE FOR DETECTION OF COVID-19 VIRUS: Primary | ICD-10-CM

## 2023-12-30 LAB
POCT INFLUENZA A: NEGATIVE
POCT INFLUENZA B: NEGATIVE
SARS-COV-2 RNA RESP QL NAA+PROBE: DETECTED

## 2023-12-30 RX ORDER — DEXAMETHASONE 4 MG/1
4 TABLET ORAL ONCE
Status: COMPLETED | OUTPATIENT
Start: 2023-12-30 | End: 2023-12-30

## 2023-12-30 NOTE — DISCHARGE INSTRUCTIONS
Please return to the ER/clinic if symptoms worsen. Follow-up with your PCP in 24-48 hours as needed. The decadron will work in your system the next several days. Recommend taking an over the counter antihistamine daily: IE zyrtec/claritin. Sleep more upright. Use chloraseptic spray to help stop the cough trigger reflex. Push fluids and gargle with warm saline rinses. If symptoms persist or worsen i.e. increasing fevers or shortness of breath go directly to the emergency room. Otherwise CDC recommends 5 days quarantine from symptoms in 5 days having masking.

## 2023-12-30 NOTE — ED INITIAL ASSESSMENT (HPI)
Pt with c/o runny nose, cough and bilateral ear pain x 2 days.      Has ill wife at home he is caring for and would like viral testing

## 2024-07-24 ENCOUNTER — HOSPITAL ENCOUNTER (OUTPATIENT)
Age: 79
Discharge: HOME | End: 2024-07-24
Payer: MEDICARE

## 2024-07-24 VITALS
TEMPERATURE: 98 F | OXYGEN SATURATION: 98 % | HEART RATE: 72 BPM | DIASTOLIC BLOOD PRESSURE: 84 MMHG | RESPIRATION RATE: 18 BRPM | SYSTOLIC BLOOD PRESSURE: 111 MMHG

## 2024-07-24 DIAGNOSIS — S61.451A DOG BITE OF RIGHT HAND, INITIAL ENCOUNTER: Primary | ICD-10-CM
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
## 2024-07-24 PROCEDURE — 99213 OFFICE O/P EST LOW 20 MIN: CPT | Performed by: NURSE PRACTITIONER

## 2024-07-24 PROCEDURE — 90715 TDAP VACCINE 7 YRS/> IM: CPT | Performed by: NURSE PRACTITIONER

## 2024-07-24 PROCEDURE — 90471 IMMUNIZATION ADMIN: CPT | Performed by: NURSE PRACTITIONER

## 2024-07-24 RX ORDER — AMOXICILLIN AND CLAVULANATE POTASSIUM 875; 125 MG/1; MG/1
1 TABLET, FILM COATED ORAL 2 TIMES DAILY
Qty: 10 TABLET | Refills: 0 | Status: SHIPPED | OUTPATIENT
Start: 2024-07-24 | End: 2024-07-29

## 2024-07-24 NOTE — ED INITIAL ASSESSMENT (HPI)
Pt here w/ two skin tears to r hand from daughter's dog. Dog UTD on immunizations. Pt not UTD on tetanus.

## 2024-07-24 NOTE — ED PROVIDER NOTES
Patient Seen in: [facility]      History     Chief Complaint   Patient presents with    Laceration/Abrasion     Stated Complaint: Dog Bite    Subjective:   79-year-old male presents today with history of dog bite to the right hand 2 hours prior to arrival.  States was his daughter's dog who is up-to-date on all of its vaccinations.  Bleeding is controlled.  Has skin tears and puncture wounds to the dorsal aspect of the hand.  Denies any other injuries or concerns.  Full range of motion of hand.  Last Tdap states was about 20 years ago  The patient's medication list, past medical history and social history elements as listed in today's nurse's notes were reviewed and agreed (except as otherwise stated in the HPI).  The patient's family history reviewed and determined to be noncontributory to the presenting problem            Objective:   Past Medical History:    Cancer (HCC)    Colon Cancer - no chemo or radiation on colon resection    Hearing impairment    [place] - has bilateral hearing aids    High cholesterol              Past Surgical History:   Procedure Laterality Date    Cholecystectomy      Colectomy      Colonoscopy      Hip replacement surgery Left 01/27/2020    Other      bariatric lap banding                 Social History     Socioeconomic History    Marital status:    Tobacco Use    Smoking status: Never    Smokeless tobacco: Never   Vaping Use    Vaping status: Never Used   Substance and Sexual Activity    Alcohol use: Yes    Drug use: Yes     Types: Cannabis     Social Determinants of Health      Received from [facility], [facility]    Social Connections    Received from [facility], [facility]    Housing Stability              Review of Systems    Positive for stated Chief Complaint: Laceration/Abrasion    Other systems are as noted in HPI.  Constitutional and vital signs reviewed.      All other systems  reviewed and negative except as noted above.    Physical Exam     ED Triage Vitals [07/24/24 1639]   /84   Pulse 72   Resp 18   Temp 98.3 °F (36.8 °C)   Temp src Temporal   SpO2 98 %   O2 Device None (Room air)       Current Vitals:   Vital Signs  BP: 111/84  Pulse: 72  Resp: 18  Temp: 98.3 °F (36.8 °C)  Temp src: Temporal    Oxygen Therapy  SpO2: 98 %  O2 Device: None (Room air)            Physical Exam  Vitals and nursing note reviewed.   Constitutional:       Appearance: Normal appearance.   HENT:      Head: Normocephalic.      Mouth/Throat:      Mouth: Mucous membranes are moist.   Cardiovascular:      Rate and Rhythm: Normal rate.   Pulmonary:      Effort: Pulmonary effort is normal.   Musculoskeletal:      Comments: Multiple superficial injuries with puncture wound as well to the dorsal aspect of the right hand.  Bleeding is controlled.  Normal flexion extension of the hand/fingers.  CMS intact.   Skin:     General: Skin is warm and dry.   Neurological:      Mental Status: He is alert and oriented to person, place, and time.               ED Course   Labs Reviewed - No data to display                   MDM     Please note that this report has been produced using speech recognition software and may contain errors related to that system including, but not limited to, errors in grammar, punctuation, and spelling, as well as words and phrases that possibly may have been recognized inappropriately.  If there are any questions or concerns, contact the dictating provider for clarification.                                         Medical Decision Making  Differential diagnosis includes but is not limited to: Dog bite, laceration, puncture wound, tendon rupture      Presents today with history of dog bite to the right hand.  Was family dog up-to-date with vaccinations.  Unknown patient's last Tdap thinks maybe 20 years ago.  Was updated here in the office today.  Wound care was completed.  No need for suturing  wounds are superficial, bleeding is controlled.  Will start patient on Augmentin as prophylactic treatment.  Wound care instructions given.  To follow with primary care physician as needed for any signs of infection or may return here to the immediate care.  Patient verbalized understanding and agreed with plan of care.    Risk  OTC drugs.  Prescription drug management.        Disposition and Plan     Clinical Impression:  1. Dog bite of right hand, initial encounter         Disposition:  Discharge  7/24/2024  4:59 pm    Follow-up:  Amira Morgan  [address] [facility] [address] [phone]      As needed          Medications Prescribed:  Current Discharge Medication List        START taking these medications    Details   amoxicillin clavulanate 875-125 MG Oral Tab Take 1 tablet by mouth 2 (two) times daily for 5 days.  Qty: 10 tablet, Refills: 0

## 2024-11-25 ENCOUNTER — OFFICE VISIT (OUTPATIENT)
Dept: FAMILY MEDICINE CLINIC | Facility: CLINIC | Age: 79
End: 2024-11-25
Payer: MEDICARE

## 2024-11-25 VITALS
SYSTOLIC BLOOD PRESSURE: 120 MMHG | DIASTOLIC BLOOD PRESSURE: 76 MMHG | WEIGHT: 230 LBS | BODY MASS INDEX: 31.15 KG/M2 | TEMPERATURE: 98 F | OXYGEN SATURATION: 97 % | HEART RATE: 58 BPM | HEIGHT: 72 IN | RESPIRATION RATE: 18 BRPM

## 2024-11-25 DIAGNOSIS — J01.00 ACUTE NON-RECURRENT MAXILLARY SINUSITIS: Primary | ICD-10-CM

## 2024-11-25 PROCEDURE — 99213 OFFICE O/P EST LOW 20 MIN: CPT | Performed by: NURSE PRACTITIONER

## 2024-11-25 NOTE — PROGRESS NOTES
CHIEF COMPLAINT:     Chief Complaint   Patient presents with    Nasal Congestion     Started couple weeks ago          HPI:   Rikki Ortega is a 79 year old male who presents for upper respiratory symptoms for  2 weeks. Patient reports congestion, sinus pain, post nasal drip, cough from post nasal . Symptoms have been unchanged since onset.  Treating symptoms with otc medication.      Current Outpatient Medications   Medication Sig Dispense Refill    amoxicillin clavulanate 875-125 MG Oral Tab Take 1 tablet by mouth 2 (two) times daily for 7 days. 14 tablet 0    valsartan 40 MG Oral Tab Take 1 tablet (40 mg total) by mouth daily.      methylPREDNISolone (MEDROL) 4 MG Oral Tablet Therapy Pack Dosepack: take as directed (Patient not taking: Reported on 3/11/2023) 1 each 0    Rizatriptan Benzoate 10 MG Oral Tab Take 1 tablet (10 mg total) by mouth 2 (two) times daily as needed. (Patient not taking: Reported on 12/30/2023)      atorvastatin 20 MG Oral Tab Take 1 tablet (20 mg total) by mouth daily. 90 tablet 3    Fenofibrate 145 MG Oral Tab Take 1 tablet (145 mg total) by mouth daily. (Patient not taking: Reported on 12/30/2023)      Citalopram Hydrobromide 20 MG Oral Tab Take 1 tablet (20 mg total) by mouth daily.        Past Medical History:    Cancer (HCC)    Colon Cancer - no chemo or radiation on colon resection    Hearing impairment    [place] - has bilateral hearing aids    High cholesterol      Past Surgical History:   Procedure Laterality Date    Cholecystectomy      Colectomy      Colonoscopy      Hip replacement surgery Left 01/27/2020    Other      bariatric lap banding          Social History     Socioeconomic History    Marital status:    Tobacco Use    Smoking status: Never    Smokeless tobacco: Never   Vaping Use    Vaping status: Never Used   Substance and Sexual Activity    Alcohol use: Yes    Drug use: Yes     Types: Cannabis     Social Drivers of Health      Received from [facility]  [facility], [facility]    Social Connections    Received from [facility], [facility]    Housing Stability         REVIEW OF SYSTEMS:   GENERAL: normal appetite  SKIN: no rashes or abnormal skin lesions  HEENT: See HPI  LUNGS: See HPI  CARDIOVASCULAR: denies chest pain or palpitations   GI: denies N/V/C or abdominal pain      EXAM:   /76   Pulse 58   Temp 97.6 °F (36.4 °C)   Resp 18   Ht 6' (1.829 m)   Wt 230 lb (104.3 kg)   SpO2 97%   BMI 31.19 kg/m²   GENERAL: well developed, well nourished,in no apparent distress  SKIN: no rashes,no suspicious lesions  HEAD: atraumatic, normocephalic.  pos tenderness on palpation of maxillary sinuses  EYES: conjunctiva clear, EOM intact  EARS: TM's pearly, no bulging, no retraction,no fluid, bony landmarks visible  NOSE: Nostrils patent, clear nasal discharge, nasal mucosa red and inflamed   THROAT: Oral mucosa pink, moist. Posterior pharynx is not erythematous. no exudates. Tonsils 1/4.    NECK: Supple, non-tender  LUNGS: clear to auscultation bilaterally, no wheezes or rhonchi. Breathing is non labored.  CARDIO: RRR without murmur  EXTREMITIES: no cyanosis, clubbing or edema  LYMPH:  no lymphadenopathy.        ASSESSMENT AND PLAN:   Rikki Ortega is a 79 year old male who presents with upper respiratory symptoms that are consistent with    ASSESSMENT:   Encounter Diagnosis   Name Primary?    Acute non-recurrent maxillary sinusitis Yes       PLAN: Meds as below.  Comfort care as described in Patient Instructions  Educated on medication  Educated on supportive measures: ibuprofen/tylenol, hydration, claritin  Educated on s/s of worsening sx and when to seek higher level of care  Follow up with pcp if not improving      Meds & Refills for this Visit:  Requested Prescriptions     Signed Prescriptions Disp Refills    amoxicillin clavulanate 875-125 MG Oral Tab 14 tablet 0     Sig: Take 1 tablet by mouth 2  (two) times daily for 7 days.     Risks, benefits, and side effects of medication explained and discussed.    The patient indicates understanding of these issues and agrees to the plan.  The patient is asked to f/u with PCP if sx's persist or worsen.  There are no Patient Instructions on file for this visit.

## (undated) DEVICE — ANTERIOR HIP: Brand: MEDLINE INDUSTRIES, INC.

## (undated) DEVICE — BLADE ELECTRODE: Brand: EDGE

## (undated) DEVICE — Device: Brand: PLUMEPEN

## (undated) DEVICE — PADDING CAST COTTON  4

## (undated) DEVICE — DRESSING AQUACEL AG 3.5 X 10

## (undated) DEVICE — SUTURE ETHIBOND 5 CCS

## (undated) DEVICE — STERILE POLYISOPRENE POWDER-FREE SURGICAL GLOVES: Brand: PROTEXIS

## (undated) DEVICE — SPECIMEN CONTAINER,POSITIVE SEAL INDICATOR, OR PACKAGED: Brand: PRECISION

## (undated) DEVICE — Device: Brand: STABLECUT®

## (undated) DEVICE — GOWN SURG AERO CHROME XXL

## (undated) DEVICE — Device

## (undated) DEVICE — DRAPE C-ARM UNIVERSAL

## (undated) DEVICE — SUTURE VICRYL 1 CPX

## (undated) DEVICE — SUTURE VICRYL 2-0 CP-1

## (undated) DEVICE — 3M™ STERI-STRIP™ REINFORCED ADHESIVE SKIN CLOSURES, R1547, 1/2 IN X 4 IN (12 MM X 100 MM), 6 STRIPS/ENVELOPE: Brand: 3M™ STERI-STRIP™

## (undated) DEVICE — HOOD, PEEL-AWAY: Brand: FLYTE

## (undated) DEVICE — SUTURE MONOCRYL 3-0 PS-2

## (undated) DEVICE — WRAP COOLING HIP W/ICE PILLOW

## (undated) DEVICE — PILLOW ABDUCTION HIP SM

## (undated) DEVICE — DECANTER BAG 9": Brand: MEDLINE INDUSTRIES, INC.

## (undated) DEVICE — KENDALL SCD EXPRESS SLEEVES, KNEE LENGTH, MEDIUM: Brand: KENDALL SCD

## (undated) DEVICE — CODMAN® INTEGRATED BIPOLAR CORD AND TUBING SET FLYING LEADS, ROTARY PUMP: Brand: CODMAN®

## (undated) NOTE — IP AVS SNAPSHOT
[address]            (For Outpatient Use Only) Initial Admit Date: 1/27/2020   Inpt/Obs Admit Date: Inpt: 1/27/20 / Obs: N/A   Discharge Date:    Milana Later:  [de-identified]   MRN: [de-identified]   CSN: [id number]   CEID: [id number] Subscriber ID:  Pt Rel to Subscriber:    Hospital Account Financial Class: Medicare    January 28, 2020

## (undated) NOTE — IP AVS SNAPSHOT
Patient Demographics     Address  [address] [place] [id number] Phone  [phone] (Home)  [phone] (Mobile) *Preferred* E-mail Address  [email or web address]      Emergency Contact(s)     Name Relation Home Work [address] [place] [address] Take aspirin 81 mg twice daily for 30 days. Tylenol may be taken round the clock to ease your mild to moderate pain. Extra Strength Tylenol 1 tab (500mg) 4 times daily. (maximum 2000mg daily)  Tramadol is a pain medication for moderate pain.   Use as nee should wash your drainage bag 1 x/week. ? After disconnecting the tubing from the catheter, pour a solution of 1 part bleach to 10 parts water through the tubing and the bag.  Rinse the inside and the outside of the drainage bag with water to remove all th o AQUACEL dressing is waterproof and does not require being covered before showering.   o Pat dressing and surrounding skin dry after shower                      AQUACEL    o MEDIPORE/COVERLET dressing is NOT waterproof and REQUIRES being covered with a isadora ? Watch for increased redness, warmth, any odor, increased drainage or opening of the incision. A little clear yellow or blood tinged drainage is normal up to 2 weeks after surgery but it should be less every day until it stops.   ? Call physician if you no ? Apply ice  or cold therapy to surgical site for 20 minutes at least four times a day, especially after therapy. Be sure there is a thin cloth barrier between skin and ice or cold therapy. ?  Change position at least every 45 minutes while awake to avoid ? Schedule outpatient physical therapy per your surgeon’s orders. ? Schedule one week follow up after surgery WITH PRIMARY CARE PHYSICIAN; review your medications over last 6 months.   Your body gets stressed by surgery and that stress can affect all your know that you had your hip replaced, as you will most likely set off the metal detector. The doctors no longer provide an identification card for this as they are easily copied.  ALSO request a wheelchair the first year to board and get off a plane…this aid directly on top of the first creating the double layer. ? Makes sure it is NOT bunched up anywhere. ? IF the tubigrip feels TOO tight, then cut it in half and only use one layer on the calf.                Please seek medical attention if your symptoms Why:  urology follow-up in 2-3 days for removal of solis catheter. Can see either nurse or physician assistant  Contact information:  [address] [place] [address]  Schedule an appointment as soon as HYDROcodone-acetaminophen  MG Tabs  Commonly known as:  Norco  Next dose due:  Last dose received today at 10 am  Notes to patient:  Do not exceed 3,000 mg of acetaminophen in 24 hours.        Take 1 tablets by mouth every 6 (six) hours as needed for 01/28/20 0546 Given      [id number] PEG 3350 (MIRALAX) powder packet 17 g 01/28/20 0635 Given      [id number] Senna (SENOKOT) tab 17.2 mg 01/27/20 2134 Given      [id number] acetaminophen (TYLENOL) tab 650 mg 01/27/20 2134 Given      [id number] [facility] Clarity Urine Clear Clear — [facility]   Spec Gravity 1.021 1.001 - 1.030 — [facility]   Glucose Urine Negative Negative mg/dl — [facility]   Bilirubin Urine Negative Negative — [facility]   Ketones Urine Negative Negative mg/dL — [facility]   Blood Urine RBC 4.01 3.80 - 5.80 x10(6)uL — [facility]   HGB 11.9 13.0 - 17.5 g/dL  [facility]   HCT 36.6 39.0 - 53.0 %  [facility]   .0 150.0 - 450.0 10(3)uL  [facility]   MCV 91.3 80.0 - 100.0 fL — [facility]   MCH 29.7 26.0 - 34.0 pg — [facility]   MCHC 32. 5 • OTHER      bariatric lap banding[ID.2]      Fam Hx family from [facility][ID.3]    Social History    Socioeconomic History      Marital status:       Spouse name: Not on file      Number of children: Not on file      Years of education: Not on file atorvastatin 20 MG Oral Tab, Take 20 mg by mouth daily. , Disp: , Rfl:   Citalopram Hydrobromide 20 MG Oral Tab, Take 20 mg by mouth daily.   , Disp: , Rfl:       ROS 10 systems reviewed and negative except as in HPI  PE[ID.2]    01/27/20  1410   BP: 101/6 PHYSICAL THERAPY HIP TREATMENT NOTE - INPATIENT      Room Number: 365/365-A     Session: 1 and 2[AR.1]  Number of Visits to Meet Established Goals: 2    Presenting Problem: S/p Direct Anterior Left RUT on 01/27/2020[AR.2]    Problem List[AR.1]  Active Prob -   Moving to and from a bed to a chair (including a wheelchair)?: A Little   -   Need to walk in hospital room?: A Little   -   Climbing 3-5 steps with a railing?: A Little[AR.2]       AM-PAC Score:[AR.1]  Raw Score: 18   Approx Degree of Impairment: 46. 5 Comments:  Pt participated in group session, tolerance was good.  was present - yes   is a - spouse and daughter[AR.1]    Patient End of Session: Up in chair;Needs met;Call light within reach;RN aware of session/findings; All patient questions an          Goal Comments: Goals established on 1/27/2020 - all goals ongoing as of[AR.1] 1/28/2020[AR.4]          Attribution Wiseman    AR. 1 Az Miles, PTA on 1/28/2020 11:16 AM  AR. 2 - Gregor Concepcion, PTA on 1/28/2020  3:20 PM  AR. 3 - Penny Sosa Prior Level of [place]: Patient was independent with ADLs & self care. Ambulated without AD. Works as  which involves travelling,driving, walking long distances.  Wife takes care of grand children @ her daughter's home though will be -   Sitting down on and standing up from a chair with arms (e.g., wheelchair, bedside commode, etc.): A Little   -   Moving from lying on back to sitting on the side of the bed?: A Little[NP.2]   How much help from another person does the patient currently of session/findings; All patient questions and concerns addressed;SCDs in place; Ice applied; Family present; Discussed recommendations with /[NP.2]    ASSESSMENT   Patient is a[NP.3 67year old[NP.2] male admitted on 1/27/2020 for[NP Goal #2  Patient is able to demonstrate transfers Sit to/from Stand at assistance level: supervison     Goal #3    Patient is able to ambulate 150 feet with assistive device at assistance level: Supervision   Goal #4    Patient will negotiate 4 stairs/one OCCUPATIONAL PROFILE    HOME SITUATION  Type of Home: House  Home Layout: Two level  Lives With: Spouse    Toilet and Equipment: Standard height toilet;Comfort height toilet  Shower/Tub and Equipment: Walk-in shower       Occupation/Status: (works FT, off Patient needed brief min A to thread LLE in to pants, OT explained how to sarah reacher, spouse reports they have one at home. Patient able to thread RLE and dress UB with sup. OT educated on use of sock aide, pt able to return demo with sup.    He stood wi Client Assessment/Performance Deficits  MODERATE - Comorbidities and min to mod modifications of tasks    Clinical Decision Making  LOW - Analysis of occupational profile, problem-focused assessments, limited treatment options    Overall Complexity  LOW

## (undated) NOTE — LETTER
[facility] Department  Phone: [phone]  Right Fax: [phone]  [facility] 20 By:  Elaine Jeff RN Date: 1/21/20    Patient Name: Carmela Footman  Surgery Date: 1/27/2020    CSN: [id number]  Medical Record: [id number]

## (undated) NOTE — LETTER
[facility] Department  Phone: [phone]  OUTSIDE TESTING RESULT REQUEST      TO:   Dr. Gus Ceja    Today's Date: 12/30/19    FAX #: [phone]    Patient has an appointment with you on 1/10/2020   and will need testing done as lis